# Patient Record
Sex: MALE | Race: WHITE | URBAN - METROPOLITAN AREA
[De-identification: names, ages, dates, MRNs, and addresses within clinical notes are randomized per-mention and may not be internally consistent; named-entity substitution may affect disease eponyms.]

---

## 2020-12-31 ENCOUNTER — TELEPHONE (OUTPATIENT)
Dept: NEPHROLOGY | Facility: CLINIC | Age: 60
End: 2020-12-31

## 2020-12-31 NOTE — TELEPHONE ENCOUNTER
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 He also stated that he had very low creatinine level per Dr. Alan Phan. Please call Radhames at your earliest convenience to discuss.      Action Taken: Message routed to:  [facility] ([facility]):  MEDICINE RENAL    Travel Screening: Not Applicable

## 2021-01-04 NOTE — TELEPHONE ENCOUNTER
Action 01.04.2021     Action Taken Called the pt at [phone] to verify where to get records left a voice message to return call.    01.06.2021 Pt left me a voice mail to call him back, called [phone] m to return call.